# Patient Record
Sex: MALE | Race: WHITE | Employment: FULL TIME | URBAN - METROPOLITAN AREA
[De-identification: names, ages, dates, MRNs, and addresses within clinical notes are randomized per-mention and may not be internally consistent; named-entity substitution may affect disease eponyms.]

---

## 2023-06-06 ENCOUNTER — APPOINTMENT (OUTPATIENT)
Dept: GENERAL RADIOLOGY | Age: 48
End: 2023-06-06
Payer: COMMERCIAL

## 2023-06-06 ENCOUNTER — HOSPITAL ENCOUNTER (EMERGENCY)
Age: 48
Discharge: HOME | End: 2023-06-06
Attending: EMERGENCY MEDICINE
Payer: COMMERCIAL

## 2023-06-06 ENCOUNTER — APPOINTMENT (OUTPATIENT)
Dept: CT IMAGING | Age: 48
End: 2023-06-06
Payer: COMMERCIAL

## 2023-06-06 VITALS
WEIGHT: 246 LBS | HEART RATE: 65 BPM | TEMPERATURE: 98.1 F | DIASTOLIC BLOOD PRESSURE: 123 MMHG | HEIGHT: 69 IN | BODY MASS INDEX: 36.43 KG/M2 | RESPIRATION RATE: 13 BRPM | SYSTOLIC BLOOD PRESSURE: 167 MMHG | OXYGEN SATURATION: 97 %

## 2023-06-06 DIAGNOSIS — R07.9 CHEST PAIN, UNSPECIFIED TYPE: Primary | ICD-10-CM

## 2023-06-06 LAB
ALBUMIN SERPL-MCNC: 4.4 GM/DL (ref 3.4–5)
ALP BLD-CCNC: 64 IU/L (ref 40–129)
ALT SERPL-CCNC: 17 U/L (ref 10–40)
ANION GAP SERPL CALCULATED.3IONS-SCNC: 10 MMOL/L (ref 4–16)
AST SERPL-CCNC: 16 IU/L (ref 15–37)
BASOPHILS ABSOLUTE: 0.1 K/CU MM
BASOPHILS RELATIVE PERCENT: 1.2 % (ref 0–1)
BILIRUB SERPL-MCNC: 0.3 MG/DL (ref 0–1)
BUN SERPL-MCNC: 15 MG/DL (ref 6–23)
CALCIUM SERPL-MCNC: 9.3 MG/DL (ref 8.3–10.6)
CHLORIDE BLD-SCNC: 104 MMOL/L (ref 99–110)
CO2: 24 MMOL/L (ref 21–32)
CREAT SERPL-MCNC: 1 MG/DL (ref 0.9–1.3)
DIFFERENTIAL TYPE: ABNORMAL
EKG ATRIAL RATE: 86 BPM
EKG DIAGNOSIS: NORMAL
EKG P AXIS: 42 DEGREES
EKG P-R INTERVAL: 154 MS
EKG Q-T INTERVAL: 364 MS
EKG QRS DURATION: 90 MS
EKG QTC CALCULATION (BAZETT): 435 MS
EKG R AXIS: -5 DEGREES
EKG T AXIS: 69 DEGREES
EKG VENTRICULAR RATE: 86 BPM
EOSINOPHILS ABSOLUTE: 0.1 K/CU MM
EOSINOPHILS RELATIVE PERCENT: 2.8 % (ref 0–3)
GFR SERPL CREATININE-BSD FRML MDRD: >60 ML/MIN/1.73M2
GLUCOSE SERPL-MCNC: 91 MG/DL (ref 70–99)
HCT VFR BLD CALC: 39.7 % (ref 42–52)
HEMOGLOBIN: 13.2 GM/DL (ref 13.5–18)
IMMATURE NEUTROPHIL %: 0.2 % (ref 0–0.43)
LYMPHOCYTES ABSOLUTE: 1.5 K/CU MM
LYMPHOCYTES RELATIVE PERCENT: 29.4 % (ref 24–44)
MCH RBC QN AUTO: 30.3 PG (ref 27–31)
MCHC RBC AUTO-ENTMCNC: 33.2 % (ref 32–36)
MCV RBC AUTO: 91.3 FL (ref 78–100)
MONOCYTES ABSOLUTE: 0.4 K/CU MM
MONOCYTES RELATIVE PERCENT: 8.6 % (ref 0–4)
NUCLEATED RBC %: 0 %
PDW BLD-RTO: 12.3 % (ref 11.7–14.9)
PLATELET # BLD: 211 K/CU MM (ref 140–440)
PMV BLD AUTO: 9.9 FL (ref 7.5–11.1)
POTASSIUM SERPL-SCNC: 4.1 MMOL/L (ref 3.5–5.1)
RBC # BLD: 4.35 M/CU MM (ref 4.6–6.2)
SEGMENTED NEUTROPHILS ABSOLUTE COUNT: 2.9 K/CU MM
SEGMENTED NEUTROPHILS RELATIVE PERCENT: 57.8 % (ref 36–66)
SODIUM BLD-SCNC: 138 MMOL/L (ref 135–145)
TOTAL IMMATURE NEUTOROPHIL: 0.01 K/CU MM
TOTAL NUCLEATED RBC: 0 K/CU MM
TOTAL PROTEIN: 6.8 GM/DL (ref 6.4–8.2)
TROPONIN T: <0.01 NG/ML
TROPONIN T: <0.01 NG/ML
WBC # BLD: 5 K/CU MM (ref 4–10.5)

## 2023-06-06 PROCEDURE — 85025 COMPLETE CBC W/AUTO DIFF WBC: CPT

## 2023-06-06 PROCEDURE — 80053 COMPREHEN METABOLIC PANEL: CPT

## 2023-06-06 PROCEDURE — 6360000002 HC RX W HCPCS: Performed by: EMERGENCY MEDICINE

## 2023-06-06 PROCEDURE — 71045 X-RAY EXAM CHEST 1 VIEW: CPT

## 2023-06-06 PROCEDURE — 93010 ELECTROCARDIOGRAM REPORT: CPT | Performed by: INTERNAL MEDICINE

## 2023-06-06 PROCEDURE — 6360000002 HC RX W HCPCS: Performed by: NURSE PRACTITIONER

## 2023-06-06 PROCEDURE — 93005 ELECTROCARDIOGRAM TRACING: CPT | Performed by: EMERGENCY MEDICINE

## 2023-06-06 PROCEDURE — 96376 TX/PRO/DX INJ SAME DRUG ADON: CPT

## 2023-06-06 PROCEDURE — 6370000000 HC RX 637 (ALT 250 FOR IP): Performed by: EMERGENCY MEDICINE

## 2023-06-06 PROCEDURE — 99285 EMERGENCY DEPT VISIT HI MDM: CPT

## 2023-06-06 PROCEDURE — 96374 THER/PROPH/DIAG INJ IV PUSH: CPT

## 2023-06-06 PROCEDURE — 84484 ASSAY OF TROPONIN QUANT: CPT

## 2023-06-06 RX ORDER — MORPHINE SULFATE 4 MG/ML
4 INJECTION, SOLUTION INTRAMUSCULAR; INTRAVENOUS ONCE
Status: COMPLETED | OUTPATIENT
Start: 2023-06-06 | End: 2023-06-06

## 2023-06-06 RX ORDER — ASPIRIN 81 MG/1
324 TABLET, CHEWABLE ORAL ONCE
Status: COMPLETED | OUTPATIENT
Start: 2023-06-06 | End: 2023-06-06

## 2023-06-06 RX ORDER — NITROGLYCERIN 0.4 MG/1
0.4 TABLET SUBLINGUAL EVERY 5 MIN PRN
Status: DISCONTINUED | OUTPATIENT
Start: 2023-06-06 | End: 2023-06-06 | Stop reason: HOSPADM

## 2023-06-06 RX ADMIN — MORPHINE SULFATE 4 MG: 4 INJECTION, SOLUTION INTRAMUSCULAR; INTRAVENOUS at 14:07

## 2023-06-06 RX ADMIN — ASPIRIN 81 MG CHEWABLE TABLET 324 MG: 81 TABLET CHEWABLE at 11:23

## 2023-06-06 RX ADMIN — MORPHINE SULFATE 4 MG: 4 INJECTION, SOLUTION INTRAMUSCULAR; INTRAVENOUS at 12:01

## 2023-06-06 ASSESSMENT — ENCOUNTER SYMPTOMS
NAUSEA: 1
BACK PAIN: 0
ABDOMINAL PAIN: 0
COLOR CHANGE: 0
VOMITING: 0
CHEST TIGHTNESS: 1

## 2023-06-06 ASSESSMENT — PAIN DESCRIPTION - ORIENTATION: ORIENTATION: LEFT

## 2023-06-06 ASSESSMENT — PAIN SCALES - GENERAL
PAINLEVEL_OUTOF10: 9
PAINLEVEL_OUTOF10: 9

## 2023-06-06 ASSESSMENT — PAIN DESCRIPTION - LOCATION: LOCATION: CHEST

## 2023-06-06 NOTE — ED NOTES
[address] [facility]  06/06/23 7265 Requesting call with results from U/S taken on 1/16/17  Writer has advised patient of a callback from the nurse with 24-72 hours.    Patient Name: Lena Damon  Caller Name: DAUGHTER/DAMIAN  Callback Number: [phone]  Additional Info: Patient had U/S and would like the results.       Thank you,  Britt Delgado

## 2023-06-06 NOTE — ED NOTES
Noted pt robbing his chest and abnormal EKG , asked pt not to rub, EKG returned to normal signes rhythm, will continue to monitor.      Rita Venegas RN  06/06/23 9270

## 2023-06-06 NOTE — ED NOTES
Patient to ED with c/o L leg pain, L arm pain, and chest pain. Started yesterday in the L leg, moved to L arm, and into chest. Patient was told medications needed switched about a week and a half ago but was told to follow up with cardiology for this. Does not have a cardiologist at this time. Patient states he stopped taking all meds because of how they made him feel (dizzy and lightheaded) until he can see a cardiologist. Hx of stent placement last year. Was on plavix and antihypertensives but has not had either for about a week and a half.      Otf Gaitan, RN  06/06/23 8817

## 2023-06-06 NOTE — ED PROVIDER NOTES
[address] ENCOUNTER      Pt Name: Zina Reagan  MRN: [id number]  [place] 1975  Date of evaluation: 6/6/2023  Provider: AUSTIN Navarro CNP  PCP: No primary care provider on file. Note Started: 11:26 AM EDT 6/6/23    CHIEF COMPLAINT       Chief Complaint   Patient presents with    Chest Pain     Feels like someone is sitting on his chest - started yesterday. Arm Pain     L arm    Leg Pain     Pain started in L leg yesterday, moved up to left arm, and then into chest. Had a stent placed last year. HISTORY OF PRESENT ILLNESS: 1 or more Elements   Zina Reagan is a 56 y.o. male who presents to the emergency department with left-sided chest pain that radiates into the left arm. Onset was yesterday. He states that it actually started in his left leg and then moved to his chest and into his left arm. It is described as a squeezing pressure and like someone is sitting on his chest.  He had similar symptoms to this 1 year ago when he had a stent placed at [facility].   It is rated 10/10. Associated symptoms include nausea without vomiting. There is no known history of DVT, PE, or thoracic aortic dissection. The patient denies fevers, chills, neck pain, shortness of breath, cough, hemoptysis, abdominal pain, nausea, vomiting, numbness, tingling, weakness, or any other complaints. The patient states that he stopped taking his medications last week because he was having lightheaded and dizziness. He states that he went to an ER but cannot tell me which ER and the ER doctor told him to stop all of his medicines. This includes his hypertensive medicine and his blood thinners. He cannot tell me the names of his medications. He is supposed to follow-up with a doctor but did not do so.   He has not seen cardiology since his stent was placed because he \"had issues\" with the cardiologist who put his
with his medications over the last several months. Does appear there is some concern for drug-seeking behavior per the chart notes, as he is frequently requesting pain medication and leaves AMA when not given IV pain medication. He did have an episode on telemetry here that was abnormal, but has some normal QRS complexes in between and I will discuss with cardiology, nurse was not present in the room when it occurred. [KA]   [phone] with Dr. Gudino Pert- agrees that this appears to be artifact as it is so regular and he has the normal QRS complexes still marching through this. And when I did speak with the nurse again she tells me that he was rubbing his chest when she did enter the room after seeing this, so I do suspect that that is likely the culprit, this does not appear to be V. tach it does not appear to be a flutter or anything like that. We will continue with our current plan, awaiting CT angiogram and repeat troponin but likely discharge home [KA]      ED Course User Index  Dre Yang MD       History from : Patient    Limitations to history : None    Patient was given the following medications:  Medications   nitroGLYCERIN (NITROSTAT) SL tablet 0.4 mg (0.4 mg SubLINGual Not Given 6/6/23 1207)   aspirin chewable tablet 324 mg (324 mg Oral Given 6/6/23 1123)   morphine sulfate (PF) injection 4 mg (4 mg IntraVENous Given 6/6/23 1201)   morphine sulfate (PF) injection 4 mg (4 mg IntraVENous Given 6/6/23 1407)       EKG (if obtained): (All EKG's are interpreted by myself in the absence of a cardiologist) normal sinus rhythm with rate of 77bpm, normal intervals, no ST elevation, no previous to compare. Chronic conditions affecting care: CAD s/p OSWALDO, noncompliant with meds. Discussion with Other Profesionals : Consultant Dr. Inder Ballesteros not appear that there is anything acute that he would need to be hospitalized for at this time but they are happy to see him in the office in follow up.  Should

## 2023-06-06 NOTE — ED NOTES
Pt had a short V-tack noted pt robbing his chest and stating has severe pain and request pain medication, EKG provided to the Dr, EKG repeated NSR, Dr. Jeniffer Olson aware of pt request, EKG placed and monitored pt at this time, will continue to monitor.      Michel De Los Santos RN  06/06/23 2109

## 2023-06-09 LAB
EKG ATRIAL RATE: 77 BPM
EKG DIAGNOSIS: NORMAL
EKG P AXIS: 35 DEGREES
EKG P-R INTERVAL: 130 MS
EKG Q-T INTERVAL: 394 MS
EKG QRS DURATION: 94 MS
EKG QTC CALCULATION (BAZETT): 445 MS
EKG R AXIS: 5 DEGREES
EKG T AXIS: 51 DEGREES
EKG VENTRICULAR RATE: 77 BPM

## 2023-06-09 PROCEDURE — 93010 ELECTROCARDIOGRAM REPORT: CPT | Performed by: INTERNAL MEDICINE
